# Patient Record
Sex: MALE | Race: WHITE | Employment: FULL TIME | URBAN - METROPOLITAN AREA
[De-identification: names, ages, dates, MRNs, and addresses within clinical notes are randomized per-mention and may not be internally consistent; named-entity substitution may affect disease eponyms.]

---

## 2020-08-10 ENCOUNTER — TELEPHONE (OUTPATIENT)
Dept: SLEEP MEDICINE | Age: 60
End: 2020-08-10

## 2020-08-10 NOTE — TELEPHONE ENCOUNTER
Pt called in wanting to make an appt for sleep study at home--explained to him that Dr Coleen Ashford would need to have referral from his PCP prior to ordering SS or seeing pt in office--pt will call PCP for referral and was given fax number for PCP to send--requested pt have PCP send most recent office note also--pt stated he has [employer]

## 2020-08-12 ENCOUNTER — HOSPITAL ENCOUNTER (OUTPATIENT)
Age: 60
Discharge: HOME | End: 2020-08-14
Payer: COMMERCIAL

## 2020-08-12 PROCEDURE — 88305 TISSUE EXAM BY PATHOLOGIST: CPT

## 2020-08-24 ENCOUNTER — TELEPHONE (OUTPATIENT)
Dept: SLEEP CENTER | Age: 60
End: 2020-08-24

## 2020-09-26 ENCOUNTER — HOSPITAL ENCOUNTER (OUTPATIENT)
Dept: SLEEP CENTER | Age: 60
Discharge: HOME | End: 2020-09-26
Payer: COMMERCIAL

## 2020-09-26 PROCEDURE — 95806 SLEEP STUDY UNATT&RESP EFFT: CPT

## 2020-10-03 NOTE — PROGRESS NOTES
[address]                               SLEEP STUDY REPORT    PATIENT NAME: YORDY LLANES                      :        1960  MED REC NO:   [id number]                            ROOM:  ACCOUNT NO:   [de-identified]                           ADMIT DATE: 2020  PROVIDER:     Allei Anguiano MD    DATE OF STUDY:  2020    PATIENT OF:  Dr. Shahbaz Fall. INDICATION FOR SLEEP TESTING:  Snoring, witnessed apnea, wakes gasping,  morning headaches. CURRENT MEDICATIONS:  None listed. INTERPRETATION:  This sleep study was performed as a home sleep apnea  test.  An ApneaLink air monitoring device was utilized for the study. Total recording time was 8 hours 34 minutes. Flow evaluation time was 5  hours 26 minutes and oxygen saturation evaluation time was 8 hours 24  minutes. RESPIRATION SUMMARY:  APNEA:  There were 14 apneic events, including 13 obstructive and 1  central.  HYPOPNEA:  There were 38 hypopneic events. RESPIRATORY EVENT INDEX:  The respiratory event index is 10. OXYGEN SATURATION:  Baseline oxygen saturation was 95%. Lowest  saturation was 88%. The patient spent 1% of the time with saturation  less than 90%. HEART RATE SUMMARY:  Average heart rate was 63 beats per minute. Maximum heart rate was more than 100 beats per minute and minimum heart  rate was 56 beats per minute. FLOW LIMITATION:  Flow limitation breaths without snore occurred 1625  times. Flow limitation breaths with snore occurred 8 times. There were  128 snore events. Snoring was mild to moderate. MISCELLANEOUS:  [place] Sleepiness Scale score is 4/24. BMI is 26.6  kg/m2. Neck circumference is 16 inches. IMPRESSION:  1. Mild obstructive sleep apnea. 2.  Good oxygen saturation. 3.  Mild to moderate snoring.     DISCUSSION:  This patient has mild obstructive sleep apnea with an  apnea/hypopnea index of 10.  With an [place] Sleepiness Scale score less  than 10, clinical correlation is indicated to determine if the patient  actually needs to be treated. Specific indications for treatment would  include nocturnal hypoxia (not seen in this case), cardiac dysrhythmia  or neurological problems such as seizure or stroke. There is a question  of the patient having atrial fibrillation in the past and therefore  likely this patient would benefit from treatment. SUGGESTIONS:  1. Michael Zavaleta to discuss results of study with the  patient. 2.  The patient should return to [facility] for positive  airway pressure titration. 3.  An alternative to titration would be of auto CPAP.         Yovani Mckeon MD    D: 10/02/2020 19:56:12       T: 10/02/2020 20:05:41     [email or web address]  Job#: [id number]     Doc#: [id number]    CC:  BETHEL Sarabia MD

## 2020-10-06 ENCOUNTER — TELEPHONE (OUTPATIENT)
Dept: SLEEP MEDICINE | Age: 60
End: 2020-10-06

## 2020-10-06 NOTE — TELEPHONE ENCOUNTER
Larry Faust,    Can you please notify patient that HST noting mild NIA (AHI 10). Dr. Bailee Hodge placed a referral to [facility] Sleep Medicine on 10/5/2020. Can you please arrange for patient to come for new patient visit within 3-4 weeks? At that time, we will discuss options for mild NIA therapy. Krissy Goel.  Ck Becker, [address] [facility] Sleep Medicine

## 2020-10-13 ENCOUNTER — TELEPHONE (OUTPATIENT)
Dept: SLEEP MEDICINE | Age: 60
End: 2020-10-13

## 2020-10-15 ENCOUNTER — OFFICE VISIT (OUTPATIENT)
Dept: SLEEP MEDICINE | Age: 60
End: 2020-10-15
Payer: COMMERCIAL

## 2020-10-15 VITALS
OXYGEN SATURATION: 99 % | RESPIRATION RATE: 16 BRPM | HEIGHT: 68 IN | SYSTOLIC BLOOD PRESSURE: 115 MMHG | BODY MASS INDEX: 27.46 KG/M2 | WEIGHT: 181.2 LBS | DIASTOLIC BLOOD PRESSURE: 76 MMHG | HEART RATE: 69 BPM

## 2020-10-15 PROCEDURE — 99214 OFFICE O/P EST MOD 30 MIN: CPT | Performed by: STUDENT IN AN ORGANIZED HEALTH CARE EDUCATION/TRAINING PROGRAM

## 2020-10-15 RX ORDER — OMEPRAZOLE 20 MG/1
20 CAPSULE, DELAYED RELEASE ORAL DAILY
COMMUNITY

## 2020-10-15 ASSESSMENT — SLEEP AND FATIGUE QUESTIONNAIRES
HOW LIKELY ARE YOU TO NOD OFF OR FALL ASLEEP WHILE WATCHING TV: 1
HOW LIKELY ARE YOU TO NOD OFF OR FALL ASLEEP WHILE SITTING AND READING: 0
HOW LIKELY ARE YOU TO NOD OFF OR FALL ASLEEP WHILE SITTING QUIETLY AFTER LUNCH WITHOUT ALCOHOL: 1
ESS TOTAL SCORE: 6
HOW LIKELY ARE YOU TO NOD OFF OR FALL ASLEEP WHEN YOU ARE A PASSENGER IN A CAR FOR AN HOUR WITHOUT A BREAK: 1
HOW LIKELY ARE YOU TO NOD OFF OR FALL ASLEEP WHILE SITTING INACTIVE IN A PUBLIC PLACE: 1
HOW LIKELY ARE YOU TO NOD OFF OR FALL ASLEEP IN A CAR, WHILE STOPPED FOR A FEW MINUTES IN TRAFFIC: 0
HOW LIKELY ARE YOU TO NOD OFF OR FALL ASLEEP WHILE LYING DOWN TO REST IN THE AFTERNOON WHEN CIRCUMSTANCES PERMIT: 2
HOW LIKELY ARE YOU TO NOD OFF OR FALL ASLEEP WHILE SITTING AND TALKING TO SOMEONE: 0

## 2020-10-15 NOTE — PATIENT INSTRUCTIONS
It was a pleasure seeing you today Nia Tran! Summary of Today's Visit     Oral appliance referral  ENT referral    Please call our office if you would like to start CPAP    In case you start CPAP, insurance requirements  New PAP Therapy instructions to be compliant with most insurance coverage:  1. Use PAP device for atleast 4 hours nightly. 2. PAP therapy must be used for 70% of nights (5/7 nights per week)  3. Patient must follow up in the clinic within 30-90 days from getting the PAP device. For general questions or scheduling issues, call my nurse at [phone] option [phone] option 2  f- [phone]        ----------------------------------------------------------  Common Issues and Solutions     Pillow is dislodging mask - Consider getting a CPAP pillow or switching to a mask with the hose at the top. Dry Mouth - Adjust Humidity and/or try Biotene Gel. (Excessive leak can also cause this)     Leak - Please call your equipment provider  as they may need to adjust your mask. Difficulty tolerating the PAP mask - Contact your equipment company to try a different mask, we can order a \"mini sleep study\"with the sleep tech to try different masks/settings of pressure, also we have a sleep psychologist to help with anxiety related to wearing the PAP mask      ----------------------------------------------------------     For Questions and Concerns: In case of difficulty with your PAP device or mask interface, please FIRST contact your [address] (The company who provides you the CPAP/BiPAP/ASV machine/supplies).      If you need the number for any other DME company, please call my Nurse at [phone] option 2     For questions concerning your [place] appointment: Call [phone] option 2  SLEEP LAB SCHEDULING:        ----------------------------------------------------------

## 2020-10-15 NOTE — PROGRESS NOTES
[facility] Sleep Medicine    Patient Name: Dylon Weber  Age: 61 y.o.   : 1960    Date of Visit: 10/15/20      HPI   Dylon Weber is a 61 y.o. male with  has a past medical history of Atrial fibrillation, chronic ([facility] [address] and GERD (gastroesophageal reflux disease). who presents as a new patient to Sleep Clinic, referred by Dr. Kenneth King, for Sleep Apnea       Patient states he was diagnosed with moderate NIA at [facility] in . No records are available. He was started on PAP therapy. He was only able to tolerate it for 3 months due to nasal discomfort. He was previously following with ENT, however he did not return after his Afib ablation. HST:Diagnostic sleep study 2020. AHI of 10. SPO2 franky of 88%. Bed time:   11:30 pm  Wake time:   6:30 am  Sleep Latency (min):  <30  Sleep Medications: None  Awakenings:    1  / bathroom  / falls back asleep quickly  Estimated Sleep time (hours):  6  Daytime Naps: while watching TV prior to bed  Sleep disturbances: occasional sinus headache  Sleep Location: Bed  Sleep environment: Sleeps with wife  Occupation: Sales    [employer]LEEP [employer]     Activities before bed: TV  Caffeine:   0  Coffee    1   Soda    0   Tea  Alcohol: none        Sleep ROS:     Snoring: Y   Witnessed apneas: Y  AM Headache: sometimes  Dry Mouth: sometimes  Daytime Sleepiness: Y  Difficulty remembering things: Y  MVA  or near miss accident due to sleepiness in the past? N  Tonsillectomy? N  Have you lost or gained weight recently? Gained- 5 lbs       PARASOMNIAS/ NARCOLEPSY:  Hypnogogic/Hynopompic Hallucinations: N   Sleep paralysis: N  Cataplexy: N   REM behavior symptoms: N  Nightmare: N   Sleep Walking: N  Sleep Talking: occasional  RLS Symptoms:  \"Restless\" \"Antsy\" does not significantly impair sleep           PMH:  Past Medical History:   Diagnosis Date    Atrial fibrillation, chronic ([address]     [facility]bl[facility]  at [facility]    GERD (gastroesophageal reflux disease) PSH:  Past Surgical History:   Procedure Laterality Date    CARDIAC SURGERY      Afib ablation in 2014          Soc Hx:  Social History     Tobacco Use    Smoking status: Never Smoker   Substance Use Topics    Alcohol use: Not on file    Drug use: Not on file        Fam Hx:  Family History   Problem Relation Age of Onset    Esophageal Cancer Mother     Liver Cancer Mother     Colon Cancer Father     Prostate Cancer Father         Current Outpatient Medications   Medication Sig Dispense Refill    omeprazole (PRILOSEC) 20 MG delayed release capsule Take 20 mg by mouth daily       No current facility-administered medications for this visit. Review of Systems  Constitutional: no chills, no fever and no night sweats. Eyes: no blurred vision and no eyesight problems. ENT: no hearing loss, no nasal congestion, no nasal discharge, no hoarseness and no sore throat. Cardiovascular: no chest pain, no lower extremity edema. Respiratory: no cough, no shortness of breath at rest and no wheezing. Gastrointestinal: no abdominal pain, no nausea and no vomiting. Musculoskeletal: no arthralgias, no back pain and no myalgias. Integumentary: no rashes. Neurological: no difficulty walking, no diplopia, no dizziness, no dysarthria, no facial weakness, no headache, no limb weakness, no memory changes, no numbness, no speech difficulties and no tingling. Endocrine: no changes in appetite, no feelings of weakness and no recent abnormal weight gain/loss. Hematologic/Lymphatic: no tendency for easy bruising or bleeding      Objective:   Physical Exam  /76 (Site: Left Upper Arm, Position: Sitting, Cuff Size: Medium Adult)   Pulse 69   Resp 16   Ht 5' 8\" (1.727 m)   Wt 181 lb 3.2 oz (82.2 kg)   SpO2 99%   BMI 27.55 kg/m²      Additional Measurements    10/15/20 1154   Neck circumference: 16.5          General: No acute distress. HEENT: Normocephalic, atraumatic. No oropharyngeal lesions. Mallampati class- 3  Tonsils- 1     Neck: Trachea midline  Lungs: Clear to auscultation bilaterally. No wheezes or crackles  Cardiac: Regular rate and rhythm. No murmurs appreciated. Neurologic: Normal gait. Balance intact. Psychiatric: Alert and oriented. Appropriate affect. Extremities: No clubbing or no peripheral edema  Skin: No lesions or rashes  Hematologic/lymphatic/immunologic: No bruises or bleeding    Sleep Medicine 10/15/2020   Sitting and reading 0   Watching TV 1   Sitting, inactive in a public place (e.g. a theatre or a meeting) 1   As a passenger in a car for an hour without a break 1   Lying down to rest in the afternoon when circumstances permit 2   Sitting and talking to someone 0   Sitting quietly after a lunch without alcohol 1   In a car, while stopped for a few minutes in traffic 0   Total score 6   Neck circumference 16.5       PROCEDURE HISTORY  HST study 9/26/2020. AHI of 10. SPO2 franky of 88%. PERTINENT LAB RESULTS  No results found for: TSH   No results found for: FERRITIN       Assessment:      Ra Underwood was seen today for sleep apnea. Diagnoses and all orders for this visit:    Obstructive sleep apnea  -     External Referral To Dentistry    Overweight    Dysphagia, unspecified type  -     [facility] May Berry, DO, Ear, Nose, Throat, [place]    Nasal discomfort  -     Donell Segal, DO, Ear, Nose, Throat, [place]    ·    Plan:      1. Mild NIA. AHI 10. Treatment options were reviewed with the patient including oral appliance, Pap therapy, weight loss, and surgical options. Patient would like to go home and discuss with his wife. Referral for ENT was placed due to nasal discomfort previously with wearing the CPAP and difficulty swallowing. Dental referral  was placed for possible oral appliance therapy. Patient stated that he will call back in a week if he would like to proceed with Pap therapy.   - Sleep study ordered to assess for sleep disordered breathing    2. Nasal discomfort/dysphagia. Patient states that he has difficulty breathing through his nose when he used his CPAP previously. He also mentions difficulty swallowing both solids and liquids recently with an accompanying cough and referral to ENT in [place]. 3. Obesity. Body mass index is 27.55 kg/m². [place]  She advised to lose weight      Patient will follow up Return in about 2 months (around 12/15/2020) for Follow up NIA.     [employer], [address] [place] [address] Rd -[phone] option 2  F- [phone]

## 2020-10-22 ENCOUNTER — OFFICE VISIT (OUTPATIENT)
Dept: ENT CLINIC | Age: 60
End: 2020-10-22
Payer: COMMERCIAL

## 2020-10-22 VITALS — BODY MASS INDEX: 27.28 KG/M2 | RESPIRATION RATE: 20 BRPM | WEIGHT: 180 LBS | HEIGHT: 68 IN

## 2020-10-22 PROCEDURE — 31575 DIAGNOSTIC LARYNGOSCOPY: CPT | Performed by: OTOLARYNGOLOGY

## 2020-10-22 PROCEDURE — 99204 OFFICE O/P NEW MOD 45 MIN: CPT | Performed by: OTOLARYNGOLOGY

## 2020-10-22 RX ORDER — FLUTICASONE PROPIONATE 50 MCG
1 SPRAY, SUSPENSION (ML) NASAL 2 TIMES DAILY
Qty: 2 BOTTLE | Refills: 1 | Status: SHIPPED | OUTPATIENT
Start: 2020-10-22

## 2020-10-22 NOTE — LETTER
Dear Dr Sahil Goodrich MD [facility]*     We had the pleasure of seeing Mary Hardwick, 1960 here    on 10/22/2020  Please see below for review of care and plans. Chief complaint-     ICD-10-CM    1. Dry mouth  R68.2    2. Cough  R05    3. Choking, initial encounter  T17.308A    4. Dry nose  J34.89    5. DNS (deviated nasal septum)  J34.2    6. Hypertrophy of both inferior nasal turbinates  J34.3          History of Present Illness-  Pt with multiple issues including Sinus headaches, nose blockage, after he eats he feels like there is something in throat, at times chokes when he is eating, has been present for a few years, cough and sxs are worsening. Drinks a bottle of water a day, drinks 1 pop a day. Sleep apnea with recent sleep study. Review of Systems- No drainage, discharge, or headache. Complete 10 system ROS completed and negative except as noted above. Physical Examination-   Vital Signs-Resp 20   Ht 5' 8\" (1.727 m)   Wt 180 lb (81.6 kg)   BMI 27.37 kg/m²     Ears- Tympanic membranes clear bilaterally. No middle ear effusion. No pre or post auricular tenderness. Nose- Nasal mucosa clear and very dry.  + significant septal deviation and b inferior turbinate hypertrophy. Oral Cavity/Oropharynx- Floor of mouth and tongue are soft and nontender. No posterior pharyngeal erythema. + gag reflex  Neck- Soft and nontender. No masses, lesions, lymphadenopathy, or thyroid nodules appreciated. Cranial Nerve- Cranial nerves II to XII intact. Extraocular muscles intact. No gross motor visual deficits. No spontaneous nystagmus. Face- No facial skin tenderness to palpation. Heart- No cyanosis, regular  Lungs- No stridor, no intercostal accessory muscle use  General- The patient is in no acute distress. A&O x3    Scope  Procedure note- after pt verbally consented, was sprayed nasally with 1:1 neosynephrine and xylocaine.  Scope was passed and found nasal cavity with no lesion but mucus stranding and dry. np clear, tonsil wnl, tongue mobile and no masses, vocal cords mobile bilaterally with full ab and ad duction. Hypopharynx clear, open and no masses. Pt with hyperemia and hypervascularity of post-cricoid mucosa that was waterfalling into posterior glottic area. Medical Decision Making and Treatment Plan. Plan at this time was to treat reflux, hoarseness, and nasal symptoms   1- ppi- cont ppi   2- Increase hydration   3- decrease caffeine/chocolate   4- diet modification   5- nasal irrigation and steroid nasal spray  6- fu 2-3 mo to eval efficacy of these tx. Talked w pt regarding affect of dryness on his other sxs including his sleep apnea. I suspect he will have some improvement but may still need nasal surgery to improve his nasal airway which may improve his sleep apnea to either tolerate cpap or obviate the need for it. If not improved, then can also consider additional apnea therapies. Thank you for the opportunity to take part in the care of this very pleasant patient, Maryanne Reyes  Sincerely,            Lee James.  Toy Galvan M.D., Ph.D., [facility]  Department of Otolaryngology-Head and Neck Surgery

## 2020-10-22 NOTE — PROGRESS NOTES
Dear MD Miriam Baron*     We had the pleasure of seeing Lety Martinez, 1960 here    on 10/22/2020  Please see below for review of care and plans. Chief complaint-     ICD-10-CM    1. Dry mouth  R68.2    2. Cough  R05    3. Choking, initial encounter  T17.308A    4. Dry nose  J34.89    5. DNS (deviated nasal septum)  J34.2    6. Hypertrophy of both inferior nasal turbinates  J34.3          History of Present Illness-  Pt with multiple issues including Sinus headaches, nose blockage, after he eats he feels like there is something in throat, at times chokes when he is eating, has been present for a few years, cough and sxs are worsening. Drinks a bottle of water a day, drinks 1 pop a day. Sleep apnea with recent sleep study. Review of Systems- No drainage, discharge, or headache. Complete 10 system ROS completed and negative except as noted above. Physical Examination-   Vital Signs-Resp 20   Ht 5' 8\" (1.727 m)   Wt 180 lb (81.6 kg)   BMI 27.37 kg/m²     Ears- Tympanic membranes clear bilaterally. No middle ear effusion. No pre or post auricular tenderness. Nose- Nasal mucosa clear and very dry.  + significant septal deviation and b inferior turbinate hypertrophy. Oral Cavity/Oropharynx- Floor of mouth and tongue are soft and nontender. No posterior pharyngeal erythema. + gag reflex  Neck- Soft and nontender. No masses, lesions, lymphadenopathy, or thyroid nodules appreciated. Cranial Nerve- Cranial nerves II to XII intact. Extraocular muscles intact. No gross motor visual deficits. No spontaneous nystagmus. Face- No facial skin tenderness to palpation. Heart- No cyanosis, regular  Lungs- No stridor, no intercostal accessory muscle use  General- The patient is in no acute distress. A&O x3    Scope  Procedure note- after pt verbally consented, was sprayed nasally with 1:1 neosynephrine and xylocaine.  Scope was passed and found nasal cavity with no lesion but mucus stranding and dry. np clear, tonsil wnl, tongue mobile and no masses, vocal cords mobile bilaterally with full ab and ad duction. Hypopharynx clear, open and no masses. Pt with hyperemia and hypervascularity of post-cricoid mucosa that was waterfalling into posterior glottic area. Medical Decision Making and Treatment Plan. Plan at this time was to treat reflux, hoarseness, and nasal symptoms   1- ppi- cont ppi   2- Increase hydration   3- decrease caffeine/chocolate   4- diet modification   5- nasal irrigation and steroid nasal spray  6- fu 2-3 mo to eval efficacy of these tx. Talked w pt regarding affect of dryness on his other sxs including his sleep apnea. I suspect he will have some improvement but may still need nasal surgery to improve his nasal airway which may improve his sleep apnea to either tolerate cpap or obviate the need for it. If not improved, then can also consider additional apnea therapies. Thank you for the opportunity to take part in the care of this very pleasant patient, Mortimer Lora  Sincerely,            Sherryle China.  Katy Bowman M.D., Ph.D., [facility]  Department of Otolaryngology-Head and Neck Surgery

## 2020-11-12 ENCOUNTER — HOSPITAL ENCOUNTER (OUTPATIENT)
Dept: GENERAL RADIOLOGY | Age: 60
Discharge: HOME | End: 2020-11-14
Payer: COMMERCIAL

## 2020-11-12 PROCEDURE — 2500000003 HC RX 250 WO HCPCS: Performed by: OTOLARYNGOLOGY

## 2020-11-12 PROCEDURE — 74230 X-RAY XM SWLNG FUNCJ C+: CPT

## 2020-11-12 PROCEDURE — 92611 MOTION FLUOROSCOPY/SWALLOW: CPT | Performed by: SPEECH-LANGUAGE PATHOLOGIST

## 2020-11-12 RX ADMIN — BARIUM SULFATE 45 G: 0.81 POWDER, FOR SUSPENSION ORAL at 10:04

## 2020-11-12 RX ADMIN — BARIUM SULFATE 45 ML: 400 SUSPENSION ORAL at 10:04

## 2020-11-12 RX ADMIN — BARIUM SULFATE 45 G: 0.6 CREAM ORAL at 10:05

## 2020-11-12 NOTE — PROGRESS NOTES
SPEECH/LANGUAGE PATHOLOGY  VIDEOFLUOROSCOPIC STUDY OF SWALLOWING (MBS)    PATIENT NAME:  Mike Barcenas      :  1960      TODAY'S DATE:  2020  ROOM:  Room/bed info not found    SUMMARY OF EVALUATION     DYSPHAGIA DIAGNOSIS:  Functional swallow       DIET RECOMMENDATIONS:  Regular consistency solids with  thin liquids     FEEDING RECOMMENDATIONS:     Assistance level:  No assistance needed      Compensatory strategies recommended: No strategies are recommended at this time    THERAPY RECOMMENDATIONS:      Dysphagia therapy is not recommended  Patient may benefit from esophagram to fully evaluate esophageal function. Patient report:  Coughing and choking after PO intake    Diet prior to admit:    Regular consistency solids with  thin liquids    Communication/Cognition:  Within functional limits               PROCEDURE       MBSImP Results:   Lip closure for intraoral bolus containment resulted in no labial escape. Tongue control during bolus hold maintained a cohesive bolus held between tongue to palate seal. Bolus preparation and mastication resulted in timely and efficient chewing and mashing. Bolus transport/lingual motion was with brisk tongue motion. Oral residue was not observed. Initiation of the pharyngeal swallow occurred as the bolus head reached the posterior angle of the mandibular ramus. Soft palate elevation resulted in no bolus between the soft palate and the pharyngeal wall. Laryngeal elevation demonstrated complete superior movement of the thyroid cartilage with complete approximation of the arytenoids to the epiglottic petiole. Anterior hyoid excursion demonstrated complete anterior movement. Epiglottic movement resulted in complete inversion. Laryngeal vestibule closure was complete, as indicated by no air or contrast within the laryngeal vestibule at the height of the swallow. Pharyngeal stripping wave was present and complete.  Pharyngeal contraction could not be determined due to logistical reasons not related to physiologic impairment. Pharyngoesophageal segment opening was completely distended for complete duration with no obstruction of bolus flow. Tongue base retraction allowed no contrast between the retracted tongue base and the posterior pharyngeal wall. Pharyngeal residue was not present. Esophageal clearance in the upright position barium present in lower esophagus not able to fully visualize entire lower esophagus       Laryngeal Penetration and Aspiration:  Neither penetration nor aspiration was observed in today's study with Cookie, Pudding-thick, Honey-thick, Nectar-thick, Thin. Current Respiratory Status   room air         COMPENSATORY STRATEGIES       Compensatory strategies were not attempted      STRUCTURAL/FUNCTIONAL ANOMALIES       No structural/functional anomalies were noted    CERVICAL ESOPHAGEAL STAGE :        The cervical esophagus appeared adequate                                     The Speech Language Pathologist (SLP) completed education with the patient regarding results of evaluation. Explained that Speech Pathology intervention is not warranted  at this time   Prognosis for improvements is good     This plan will be re-evaluated and revised in 1 week  if warranted. Patient stated goals: Agreed with above,   Treatment goals discussed with Patient   The Patient understand(s) the diagnosis, prognosis and plan of care      Evaluation time includes  review of current medical information, gathering information on past medical history/social history and prior level of function, completion of standardized testing/informal observation of tasks, assessment of data, and development of POC/Goals. CPT code:  02810  dysphagia study    [x]The admitting diagnosis and active problem list, as listed below have been reviewed prior to initiation of this evaluation.      ADMITTING DIAGNOSIS: Choking due to food (regurgitated), initial encounter [C21.933M]     ACTIVE PROBLEM LIST: There is no problem list on file for this patient.       Frederick Herndon MSCCC/SLP  Speech Language Pathologist  [id number]

## 2021-01-05 ENCOUNTER — OFFICE VISIT (OUTPATIENT)
Dept: ENT CLINIC | Age: 61
End: 2021-01-05
Payer: COMMERCIAL

## 2021-01-05 VITALS — HEIGHT: 68 IN | RESPIRATION RATE: 20 BRPM | BODY MASS INDEX: 27.28 KG/M2 | WEIGHT: 180 LBS

## 2021-01-05 DIAGNOSIS — J34.2 NASAL SEPTAL DEVIATION: Primary | ICD-10-CM

## 2021-01-05 DIAGNOSIS — R51.9 NONINTRACTABLE EPISODIC HEADACHE, UNSPECIFIED HEADACHE TYPE: ICD-10-CM

## 2021-01-05 PROCEDURE — 99213 OFFICE O/P EST LOW 20 MIN: CPT | Performed by: OTOLARYNGOLOGY

## 2021-01-05 RX ORDER — FLUTICASONE PROPIONATE 50 MCG
2 SPRAY, SUSPENSION (ML) NASAL DAILY
Qty: 1 BOTTLE | Refills: 5 | Status: SHIPPED
Start: 2021-01-05 | End: 2021-01-05

## 2021-01-05 NOTE — LETTER
Dear Dr Alyssa Hanson MD     We had the pleasure of seeing Elias Marie, 1960 here    on 1/5/21  Please see below for review of care and plans. Chief complaint-     ICD-10-CM    1. Nasal septal deviation  J34.2 CT SINUS WO CONTRAST   2. Nonintractable episodic headache, unspecified headache type  R51.9          History of Present Illness-  Pt with multiple issues including Sinus headaches, nose blockage, after he eats he feels like there is something in throat, at times chokes when he is eating, has been present for a few years, cough and sxs are worsening. Drinks a bottle of water a day, drinks 1 pop a day. Sleep apnea with recent sleep study. 1/5/21: Patient present today for follow up of sinus complaints and nasal blockage. States that he has tried increasing his hydration although it has been difficult with getting up to urinate throughout the night. He is now drinking 4 bottles of water per day. He is drinking one coke every other day and uses excedrin several times a week for headaches. He is still having bad headaches several times per week that he describes over his frontal sinus and back of neck region. excedrin relieves them. He continues to use flonase and nasal saline rinses with improvement in congestion and blockage. He is not however using flonase consistently. Review of Systems- No drainage, discharge, or headache. Complete 10 system ROS completed and negative except as noted above. Physical Examination-   Vital Signs-Resp 20   Ht 5' 8\" (1.727 m)   Wt 180 lb (81.6 kg)   BMI 27.37 kg/m²     Ears- Tympanic membranes clear bilaterally. No middle ear effusion. No pre or post auricular tenderness. Nose- Nasal mucosa clear and very dry.  + significant septal deviation and b inferior turbinate hypertrophy. Oral Cavity/Oropharynx- Floor of mouth and tongue are soft and nontender.   No posterior pharyngeal erythema. + gag reflex Neck- Soft and nontender. No masses, lesions, lymphadenopathy, or thyroid nodules appreciated. Cranial Nerve- Cranial nerves II to XII intact. Extraocular muscles intact. No gross motor visual deficits. No spontaneous nystagmus. Face- No facial skin tenderness to palpation. Heart- No cyanosis, regular  Lungs- No stridor, no intercostal accessory muscle use  General- The patient is in no acute distress. A&O x3    Scope  Procedure note- after pt verbally consented, was sprayed nasally with 1:1 neosynephrine and xylocaine. Scope was passed and found nasal cavity with no lesion but mucus stranding and dry. np clear, tonsil wnl, tongue mobile and no masses, vocal cords mobile bilaterally with full ab and ad duction. Hypopharynx clear, open and no masses. Pt with hyperemia and hypervascularity of post-cricoid mucosa that was waterfalling into posterior glottic area. Medical Decision Making and Treatment Plan. Plan at this time was to treat reflux, hoarseness, and nasal symptoms   1- ppi- cont ppi   2- Increase hydration   3- decrease caffeine/chocolate   4- diet modification   5- nasal irrigation and steroid nasal spray - will trial discontinuation of flonase as patient is improving significantly with nasal saline rinses. 6- fu 2-3 mo to eval efficacy of these tx. Talked w pt regarding affect of dryness on his other sxs including his sleep apnea. I suspect he will have some improvement but may still need nasal surgery to improve his nasal airway which may improve his sleep apnea to either tolerate cpap or obviate the need for it.   If not improved, then can also consider additional apnea therapies- hold off for now

## 2021-01-05 NOTE — PROGRESS NOTES
Dear Dr Stephy Ocampo MD     We had the pleasure of seeing Racquel Cunningham, 1960 here    on 1/5/21  Please see below for review of care and plans. Chief complaint-     ICD-10-CM    1. Nasal septal deviation  J34.2 CT SINUS WO CONTRAST   2. Nonintractable episodic headache, unspecified headache type  R51.9          History of Present Illness-  Pt with multiple issues including Sinus headaches, nose blockage, after he eats he feels like there is something in throat, at times chokes when he is eating, has been present for a few years, cough and sxs are worsening. Drinks a bottle of water a day, drinks 1 pop a day. Sleep apnea with recent sleep study. 1/5/21: Patient present today for follow up of sinus complaints and nasal blockage. States that he has tried increasing his hydration although it has been difficult with getting up to urinate throughout the night. He is now drinking 4 bottles of water per day. He is drinking one coke every other day and uses excedrin several times a week for headaches. He is still having bad headaches several times per week that he describes over his frontal sinus and back of neck region. excedrin relieves them. He continues to use flonase and nasal saline rinses with improvement in congestion and blockage. He is not however using flonase consistently. Review of Systems- No drainage, discharge, or headache. Complete 10 system ROS completed and negative except as noted above. Physical Examination-   Vital Signs-Resp 20   Ht 5' 8\" (1.727 m)   Wt 180 lb (81.6 kg)   BMI 27.37 kg/m²     Ears- Tympanic membranes clear bilaterally. No middle ear effusion. No pre or post auricular tenderness. Nose- Nasal mucosa clear and very dry.  + significant septal deviation and b inferior turbinate hypertrophy. Oral Cavity/Oropharynx- Floor of mouth and tongue are soft and nontender.   No posterior pharyngeal erythema. + gag reflex Neck- Soft and nontender. No masses, lesions, lymphadenopathy, or thyroid nodules appreciated. Cranial Nerve- Cranial nerves II to XII intact. Extraocular muscles intact. No gross motor visual deficits. No spontaneous nystagmus. Face- No facial skin tenderness to palpation. Heart- No cyanosis, regular  Lungs- No stridor, no intercostal accessory muscle use  General- The patient is in no acute distress. A&O x3    Scope  Procedure note- after pt verbally consented, was sprayed nasally with 1:1 neosynephrine and xylocaine. Scope was passed and found nasal cavity with no lesion but mucus stranding and dry. np clear, tonsil wnl, tongue mobile and no masses, vocal cords mobile bilaterally with full ab and ad duction. Hypopharynx clear, open and no masses. Pt with hyperemia and hypervascularity of post-cricoid mucosa that was waterfalling into posterior glottic area. Medical Decision Making and Treatment Plan. Plan at this time was to treat reflux, hoarseness, and nasal symptoms   1- ppi- cont ppi   2- Increase hydration   3- decrease caffeine/chocolate   4- diet modification   5- nasal irrigation and steroid nasal spray - will trial discontinuation of flonase as patient is improving significantly with nasal saline rinses. 6- fu 2-3 mo to eval efficacy of these tx. Talked w pt regarding affect of dryness on his other sxs including his sleep apnea. I suspect he will have some improvement but may still need nasal surgery to improve his nasal airway which may improve his sleep apnea to either tolerate cpap or obviate the need for it.   If not improved, then can also consider additional apnea therapies- hold off for now 7- Will obtain CT sinus wo contrast to rule out organic cause for headaches. Will refer to neurology if CT scan is negative for organic pathology. Suspect he has no sig sinus disease and his headaches are not consistent with sinus headaches and seem more a a tension or migraine variant type. Thank you for the opportunity to take part in the care of this very pleasant patient, Shellie Shelly  Sincerely,            Elke Domingo.  Gracia Del Rosario M.D., Ph.D., [facility]  Department of Otolaryngology-Head and Neck Surgery

## 2021-06-25 ENCOUNTER — APPOINTMENT (OUTPATIENT)
Dept: CT IMAGING | Age: 61
End: 2021-06-25
Payer: COMMERCIAL

## 2021-06-25 ENCOUNTER — APPOINTMENT (OUTPATIENT)
Dept: GENERAL RADIOLOGY | Age: 61
End: 2021-06-25
Payer: COMMERCIAL

## 2021-06-25 ENCOUNTER — HOSPITAL ENCOUNTER (EMERGENCY)
Age: 61
Discharge: HOME | End: 2021-06-25
Payer: COMMERCIAL

## 2021-06-25 VITALS
HEART RATE: 74 BPM | SYSTOLIC BLOOD PRESSURE: 121 MMHG | OXYGEN SATURATION: 98 % | TEMPERATURE: 98.1 F | DIASTOLIC BLOOD PRESSURE: 78 MMHG | RESPIRATION RATE: 18 BRPM

## 2021-06-25 DIAGNOSIS — S09.90XA INJURY OF HEAD, INITIAL ENCOUNTER: ICD-10-CM

## 2021-06-25 DIAGNOSIS — S50.02XA CONTUSION OF LEFT ELBOW, INITIAL ENCOUNTER: ICD-10-CM
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
## 2021-06-25 PROCEDURE — 99283 EMERGENCY DEPT VISIT LOW MDM: CPT

## 2021-06-25 PROCEDURE — 70450 CT HEAD/BRAIN W/O DYE: CPT

## 2021-06-25 PROCEDURE — 72125 CT NECK SPINE W/O DYE: CPT

## 2021-06-25 PROCEDURE — 6370000000 HC RX 637 (ALT 250 FOR IP): Performed by: NURSE PRACTITIONER

## 2021-06-25 PROCEDURE — 73070 X-RAY EXAM OF ELBOW: CPT

## 2021-06-25 RX ORDER — ACETAMINOPHEN 500 MG
1000 TABLET ORAL ONCE
Status: COMPLETED | OUTPATIENT
Start: 2021-06-25 | End: 2021-06-25

## 2021-06-25 RX ORDER — IBUPROFEN 800 MG/1
800 TABLET ORAL EVERY 6 HOURS PRN
Qty: 21 TABLET | Refills: 0 | Status: SHIPPED | OUTPATIENT
Start: 2021-06-25

## 2021-06-25 RX ADMIN — ACETAMINOPHEN 1000 MG: 500 TABLET ORAL at 13:47

## 2021-06-25 ASSESSMENT — PAIN SCALES - GENERAL
PAINLEVEL_OUTOF10: 7
PAINLEVEL_OUTOF10: 6

## 2023-04-08 ENCOUNTER — APPOINTMENT (OUTPATIENT)
Dept: GENERAL RADIOLOGY | Age: 63
End: 2023-04-08
Payer: COMMERCIAL

## 2023-04-08 ENCOUNTER — HOSPITAL ENCOUNTER (EMERGENCY)
Age: 63
Discharge: HOME | End: 2023-04-08
Payer: COMMERCIAL

## 2023-04-08 VITALS
OXYGEN SATURATION: 100 % | RESPIRATION RATE: 18 BRPM | WEIGHT: 180 LBS | SYSTOLIC BLOOD PRESSURE: 142 MMHG | BODY MASS INDEX: 27.37 KG/M2 | DIASTOLIC BLOOD PRESSURE: 84 MMHG | HEART RATE: 94 BPM | TEMPERATURE: 99 F

## 2023-04-08 DIAGNOSIS — R07.89 CHEST WALL PAIN: Primary | ICD-10-CM

## 2023-04-08 DIAGNOSIS — J20.9 ACUTE BRONCHITIS, UNSPECIFIED ORGANISM: ICD-10-CM

## 2023-04-08 DIAGNOSIS — J98.11 ATELECTASIS: ICD-10-CM

## 2023-04-08 PROCEDURE — 71046 X-RAY EXAM CHEST 2 VIEWS: CPT

## 2023-04-08 PROCEDURE — 6360000002 HC RX W HCPCS: Performed by: PHYSICIAN ASSISTANT

## 2023-04-08 PROCEDURE — 99211 OFF/OP EST MAY X REQ PHY/QHP: CPT

## 2023-04-08 RX ORDER — PREDNISONE 10 MG/1
TABLET ORAL
Qty: 14 TABLET | Refills: 0 | Status: SHIPPED | OUTPATIENT
Start: 2023-04-08

## 2023-04-08 RX ORDER — PREDNISONE 10 MG/1
TABLET ORAL
Qty: 14 TABLET | Refills: 0 | Status: SHIPPED | OUTPATIENT
Start: 2023-04-08 | End: 2023-04-08 | Stop reason: SDUPTHER

## 2023-04-08 RX ORDER — DOXYCYCLINE HYCLATE 100 MG
100 TABLET ORAL 2 TIMES DAILY
Qty: 14 TABLET | Refills: 0 | Status: SHIPPED | OUTPATIENT
Start: 2023-04-08 | End: 2023-04-08 | Stop reason: SDUPTHER

## 2023-04-08 RX ORDER — ORPHENADRINE CITRATE 30 MG/ML
60 INJECTION INTRAMUSCULAR; INTRAVENOUS ONCE
Status: COMPLETED | OUTPATIENT
Start: 2023-04-08 | End: 2023-04-08

## 2023-04-08 RX ORDER — TIZANIDINE 4 MG/1
4 TABLET ORAL 2 TIMES DAILY PRN
Qty: 12 TABLET | Refills: 0 | Status: SHIPPED | OUTPATIENT
Start: 2023-04-08

## 2023-04-08 RX ORDER — ALBUTEROL SULFATE 90 UG/1
2 AEROSOL, METERED RESPIRATORY (INHALATION) 4 TIMES DAILY PRN
Qty: 1 EACH | Refills: 0 | Status: SHIPPED | OUTPATIENT
Start: 2023-04-08 | End: 2023-04-08 | Stop reason: SDUPTHER

## 2023-04-08 RX ORDER — TIZANIDINE 4 MG/1
4 TABLET ORAL 2 TIMES DAILY PRN
Qty: 12 TABLET | Refills: 0 | Status: SHIPPED | OUTPATIENT
Start: 2023-04-08 | End: 2023-04-08 | Stop reason: SDUPTHER

## 2023-04-08 RX ORDER — ALBUTEROL SULFATE 90 UG/1
2 AEROSOL, METERED RESPIRATORY (INHALATION) 4 TIMES DAILY PRN
Qty: 1 EACH | Refills: 0 | Status: SHIPPED | OUTPATIENT
Start: 2023-04-08

## 2023-04-08 RX ORDER — KETOROLAC TROMETHAMINE 30 MG/ML
30 INJECTION, SOLUTION INTRAMUSCULAR; INTRAVENOUS ONCE
Status: COMPLETED | OUTPATIENT
Start: 2023-04-08 | End: 2023-04-08

## 2023-04-08 RX ORDER — DOXYCYCLINE HYCLATE 100 MG
100 TABLET ORAL 2 TIMES DAILY
Qty: 14 TABLET | Refills: 0 | Status: SHIPPED | OUTPATIENT
Start: 2023-04-08 | End: 2023-04-15

## 2023-04-08 RX ADMIN — KETOROLAC TROMETHAMINE 30 MG: 30 INJECTION, SOLUTION INTRAMUSCULAR at 15:20

## 2023-04-08 RX ADMIN — ORPHENADRINE CITRATE 60 MG: 30 INJECTION INTRAMUSCULAR; INTRAVENOUS at 15:21

## 2023-04-08 NOTE — DISCHARGE INSTRUCTIONS
Atelectasis means the lung does not blow up to its normal size with each breath. It can happen if something presses on the lung or blocks the air flow inside it. It can also happen if something gets stuck in the tubes that carry air to the lungs and inside the lungs. Doctors call these tubes \"airways. \"   Try to take deeper breaths  Take Robitussin DM for chest congestion and cough.

## 2023-04-08 NOTE — ED PROVIDER NOTES
[facility] Assessment  BP: (!) 142/84  Heart Rate: 94           ---------------------------------------------PHYSICAL EXAM --------------------------------------------    Vitals:    04/08/23 1451 04/08/23 1453   BP:  (!) 142/84   Pulse:  94   Resp:  18   Temp:  99 °F (37.2 °C)   SpO2:  100%   Weight: 180 lb (81.6 kg)      Oxygen Saturation Interpretation: Normal     Physical Exam  Vitals and nursing note reviewed. Constitutional:       General: He is not in acute distress. Appearance: Normal appearance. HENT:      Head: Normocephalic and atraumatic. Jaw: There is normal jaw occlusion. Right Ear: Hearing and external ear normal.      Left Ear: Hearing and external ear normal.      Nose: Nose normal.      Mouth/Throat:      Mouth: Mucous membranes are moist. No angioedema. Pharynx: Oropharynx is clear. Uvula midline. No pharyngeal swelling, oropharyngeal exudate, posterior oropharyngeal erythema or uvula swelling. Comments: Oropharynx is patent. Eyes:      Extraocular Movements: Extraocular movements intact. Conjunctiva/sclera: Conjunctivae normal.      Pupils: Pupils are equal, round, and reactive to light. Cardiovascular:      Rate and Rhythm: Normal rate and regular rhythm. Pulses: Normal pulses. Heart sounds: Normal heart sounds. Pulmonary:      Effort: Pulmonary effort is normal.      Breath sounds: Normal breath sounds. Chest:      Chest wall: Tenderness present. No deformity, swelling, crepitus or edema. Abdominal:      Tenderness: There is no abdominal tenderness. Musculoskeletal:         General: Normal range of motion. Cervical back: Full passive range of motion without pain, normal range of motion and neck supple. Lymphadenopathy:      Cervical: No cervical adenopathy. Skin:     General: Skin is warm and dry. Findings: No rash. Neurological:      General: No focal deficit present.       Mental Status: He is alert and oriented

## 2025-05-31 ENCOUNTER — TRANSCRIBE ORDERS (OUTPATIENT)
Dept: ADMINISTRATIVE | Age: 65
End: 2025-05-31

## 2025-05-31 DIAGNOSIS — K76.89 OTHER SPECIFIED DISEASES OF LIVER: Primary | ICD-10-CM

## 2025-07-20 ENCOUNTER — APPOINTMENT (OUTPATIENT)
Dept: CT IMAGING | Age: 65
End: 2025-07-20
Payer: OTHER MISCELLANEOUS

## 2025-07-20 ENCOUNTER — HOSPITAL ENCOUNTER (EMERGENCY)
Age: 65
Discharge: HOME | End: 2025-07-20
Attending: STUDENT IN AN ORGANIZED HEALTH CARE EDUCATION/TRAINING PROGRAM
Payer: OTHER MISCELLANEOUS

## 2025-07-20 ENCOUNTER — APPOINTMENT (OUTPATIENT)
Dept: GENERAL RADIOLOGY | Age: 65
End: 2025-07-20
Payer: OTHER MISCELLANEOUS

## 2025-07-20 VITALS
BODY MASS INDEX: 27.37 KG/M2 | SYSTOLIC BLOOD PRESSURE: 132 MMHG | DIASTOLIC BLOOD PRESSURE: 82 MMHG | HEART RATE: 74 BPM | WEIGHT: 180 LBS | RESPIRATION RATE: 18 BRPM | TEMPERATURE: 98.1 F | OXYGEN SATURATION: 96 %
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
## 2025-07-20 DIAGNOSIS — M25.512 ACUTE PAIN OF LEFT SHOULDER: ICD-10-CM

## 2025-07-20 PROCEDURE — 71260 CT THORAX DX C+: CPT

## 2025-07-20 PROCEDURE — 6370000000 HC RX 637 (ALT 250 FOR IP)

## 2025-07-20 PROCEDURE — 73030 X-RAY EXAM OF SHOULDER: CPT

## 2025-07-20 PROCEDURE — 99285 EMERGENCY DEPT VISIT HI MDM: CPT

## 2025-07-20 PROCEDURE — 72125 CT NECK SPINE W/O DYE: CPT

## 2025-07-20 PROCEDURE — 74177 CT ABD & PELVIS W/CONTRAST: CPT

## 2025-07-20 PROCEDURE — 6360000004 HC RX CONTRAST MEDICATION: Performed by: RADIOLOGY

## 2025-07-20 PROCEDURE — 70450 CT HEAD/BRAIN W/O DYE: CPT

## 2025-07-20 PROCEDURE — 73090 X-RAY EXAM OF FOREARM: CPT

## 2025-07-20 RX ORDER — HYDROCODONE BITARTRATE AND ACETAMINOPHEN 5; 325 MG/1; MG/1
1 TABLET ORAL ONCE
Status: DISCONTINUED | OUTPATIENT
Start: 2025-07-20 | End: 2025-07-20 | Stop reason: HOSPADM

## 2025-07-20 RX ORDER — IBUPROFEN 800 MG/1
800 TABLET, FILM COATED ORAL ONCE
Status: COMPLETED | OUTPATIENT
Start: 2025-07-20 | End: 2025-07-20

## 2025-07-20 RX ORDER — IOPAMIDOL 755 MG/ML
75 INJECTION, SOLUTION INTRAVASCULAR
Status: COMPLETED | OUTPATIENT
Start: 2025-07-20 | End: 2025-07-20

## 2025-07-20 RX ADMIN — IOPAMIDOL 75 ML: 755 INJECTION, SOLUTION INTRAVENOUS at 17:47

## 2025-07-20 RX ADMIN — IBUPROFEN 800 MG: 800 TABLET, FILM COATED ORAL at 19:38

## 2025-07-20 ASSESSMENT — PAIN DESCRIPTION - LOCATION: LOCATION: ARM;SHOULDER

## 2025-07-20 ASSESSMENT — PAIN SCALES - GENERAL: PAINLEVEL_OUTOF10: 3

## 2025-07-20 ASSESSMENT — PAIN DESCRIPTION - ORIENTATION: ORIENTATION: LEFT

## 2025-07-20 ASSESSMENT — PAIN DESCRIPTION - DESCRIPTORS: DESCRIPTORS: ACHING
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
## 2025-07-21 NOTE — DISCHARGE INSTRUCTIONS
Use ibuprofen and Tylenol as needed for pain.  Please return to the emergency department with any new or worsening symptoms, if you lose feeling in any of your extremities, if you develop significant bruising across your chest or your stomach, you develop difficulty breathing, or you have chest pain.

## 2025-09-04 ENCOUNTER — TRANSCRIBE ORDERS (OUTPATIENT)
Dept: ADMINISTRATIVE | Age: 65
End: 2025-09-04

## 2025-09-04 DIAGNOSIS — E78.00 PURE HYPERCHOLESTEROLEMIA: Primary | ICD-10-CM
